# Patient Record
Sex: FEMALE | Race: WHITE | Employment: OTHER | URBAN - METROPOLITAN AREA
[De-identification: names, ages, dates, MRNs, and addresses within clinical notes are randomized per-mention and may not be internally consistent; named-entity substitution may affect disease eponyms.]

---

## 2017-06-08 ENCOUNTER — HOSPITAL ENCOUNTER (OUTPATIENT)
Dept: MAMMOGRAPHY | Age: 72
Discharge: HOME | End: 2017-06-08
Attending: FAMILY MEDICINE
Payer: MEDICARE

## 2017-06-08 DIAGNOSIS — Z12.31 VISIT FOR SCREENING MAMMOGRAM: ICD-10-CM

## 2017-06-08 PROCEDURE — 77067 SCR MAMMO BI INCL CAD: CPT

## 2017-06-20 NOTE — PROGRESS NOTES
No specific evidence of cancer but More views needed. Radiology should contact to set up.  If have not heard from them in 2 days let us know

## 2017-06-22 ENCOUNTER — HOSPITAL ENCOUNTER (OUTPATIENT)
Dept: MAMMOGRAPHY | Age: 72
Discharge: HOME | End: 2017-06-22
Attending: FAMILY MEDICINE
Payer: MEDICARE

## 2017-06-22 DIAGNOSIS — R92.8 ABNORMAL MAMMOGRAM: ICD-10-CM

## 2017-06-22 PROCEDURE — 77066 DX MAMMO INCL CAD BI: CPT

## 2017-06-22 PROCEDURE — 76642 ULTRASOUND BREAST LIMITED: CPT

## 2017-06-23 ENCOUNTER — HOSPITAL ENCOUNTER (OUTPATIENT)
Dept: MAMMOGRAPHY | Age: 72
Discharge: HOME | End: 2017-06-23
Attending: FAMILY MEDICINE
Payer: MEDICARE

## 2017-06-23 VITALS — SYSTOLIC BLOOD PRESSURE: 158 MMHG | HEART RATE: 79 BPM | TEMPERATURE: 96.1 F | DIASTOLIC BLOOD PRESSURE: 76 MMHG

## 2017-06-23 DIAGNOSIS — N63.10 MASS OF RIGHT BREAST: ICD-10-CM

## 2017-06-23 DIAGNOSIS — N63.10 BREAST MASS, RIGHT: ICD-10-CM

## 2017-06-23 PROCEDURE — 88305 TISSUE EXAM BY PATHOLOGIST: CPT | Performed by: FAMILY MEDICINE

## 2017-06-23 PROCEDURE — 74011000250 HC RX REV CODE- 250

## 2017-06-23 PROCEDURE — A4648 IMPLANTABLE TISSUE MARKER: HCPCS

## 2017-06-23 PROCEDURE — 77065 DX MAMMO INCL CAD UNI: CPT

## 2017-06-23 RX ORDER — LIDOCAINE HYDROCHLORIDE 10 MG/ML
5 INJECTION INFILTRATION; PERINEURAL
Status: COMPLETED | OUTPATIENT
Start: 2017-06-23 | End: 2017-06-23

## 2017-06-23 RX ADMIN — LIDOCAINE HYDROCHLORIDE 5 ML: 10 INJECTION, SOLUTION INFILTRATION; PERINEURAL at 09:05

## 2017-08-16 ENCOUNTER — HOSPITAL ENCOUNTER (OUTPATIENT)
Dept: PHYSICAL THERAPY | Age: 72
Discharge: HOME | End: 2017-08-16
Attending: FAMILY MEDICINE
Payer: MEDICARE

## 2017-08-16 DIAGNOSIS — M70.61 TROCHANTERIC BURSITIS OF RIGHT HIP: ICD-10-CM

## 2017-08-16 PROCEDURE — 97035 APP MDLTY 1+ULTRASOUND EA 15: CPT

## 2017-08-16 PROCEDURE — G8979 MOBILITY GOAL STATUS: HCPCS

## 2017-08-16 PROCEDURE — 97161 PT EVAL LOW COMPLEX 20 MIN: CPT

## 2017-08-16 PROCEDURE — G8978 MOBILITY CURRENT STATUS: HCPCS

## 2017-08-16 PROCEDURE — 97110 THERAPEUTIC EXERCISES: CPT

## 2017-08-16 NOTE — PROGRESS NOTES
Therapy Center at [facility] Therapy   [address], [address]  [phone]   [phone]    Cedrick Kessler  : 1945       OUTPATIENT PHYSICAL THERAPY:Initial Assessment 2017    ICD-10: Treatment Diagnosis: R hip bursitis M70.61  Precautions/Allergies:   Codeine phosphate; Flagyl [metronidazole]; Levaquin [levofloxacin]; and Zithromax [azithromycin]   Fall Risk Score: 2 (? 5 = High Risk)  MD Orders: Eval and treat MEDICAL/REFERRING DIAGNOSIS:  Trochanteric bursitis of right hip [M70.61]   DATE OF ONSET: 2017  REFERRING PHYSICIAN: Harpreet Mackay. [facility].: as needed     INITIAL ASSESSMENT:  Ms. Tiffanie Buck presents with increased right hip pain since April. She reported moving to a new home and she was lifting and moving boxes more than she normally would. She thinks this may have caused the pain. She has had an injection in the hip early August and reported that has helped some. The area is still painful with sitting, standing and first few initial steps after standing up. PROBLEM LIST (Impacting functional limitations):  1. Decreased Strength  2. Decreased Ambulation Ability/Technique  3. Increased Pain  4. Decreased Flexibility/Joint Mobility INTERVENTIONS PLANNED:  1. Heat  2. Home Exercise Program (HEP)  3. Manual Therapy  4. Range of Motion (ROM)  5. Therapeutic Exercise/Strengthening  6. Ultrasound (US)   TREATMENT PLAN:  Effective Dates: 17 TO 10/25/17. Frequency/Duration: 2 times a week for 10 weeks and upon reassessment,  will adjust frequency and duration as progress indicates. GOALS: (Goals have been discussed and agreed upon with patient.)  Short-Term Functional Goals: Time Frame: 5 weeks  1. Establish independent HEP with no cueing. 2. Pt will be able to report walking up to 20 minutes without increased pain.   3. Pt will be able to gain 1/2 grade increase in strength to stand and walk for longer periods of time. Discharge Goals: Time Frame: 10 weeks  1. Improve score on LEFS by at least 10 points for improved ADL function. 2. Pt will be able to report little to no difficulty with housework. 3. Pt will be able to report ability to lie on right side for at least 2-3 hours per night without medication. Rehabilitation Potential For Stated Goals: Good  Regarding Elvia Card's therapy, I certify that the treatment plan above will be carried out by a therapist or under their direction. Thank you for this referral,  Quincy Patterson, PT     Referring Physician Signature: Zeenat Cheng*              Date                    The information in this section was collected on 8/16/17 (except where otherwise noted). HISTORY:   History of Present Injury/Illness (Reason for Referral):  Pt is a 67year old female with increased right hip pain since April. She reported moving to a new home and moved boxes more than she normally would and thought this might have increased her pain. She did receive injection in early August which has helped some. She still reports pain with prolonged standing, walking and first few steps of walking after standing up. Past Medical History/Comorbidities:   Ms. Lucrecia Ace  has a past medical history of Atypical depressive disorder; Chronic renal disease; Essential hypertension, benign; Glaucoma; Insomnia, unspecified; Iron deficiency anemia secondary to inadequate dietary iron intake; Migraines; Mixed hyperlipidemia; Splenic artery aneurysm ([address] Unspecified hypothyroidism; and Varicella. Ms. Lucrecia Ace  has a past surgical history that includes hysterectomy; tonsil and adenoidectomy; cholecystectomy; urological; refractive surgery; colonoscopy (2012); and endoscopy (2012). Social History/Living Environment:     pt lives with spouse.   He has chronic low back pain  Prior Level of Function/Work/Activity:  retired  Dominant Side:         RIGHT    Current Medications: Current Outpatient Prescriptions:     diclofenac (VOLTAREN) 1 % gel, Apply  to affected area four (4) times daily. , Disp: 100 g, Rfl: 1    simvastatin (ZOCOR) 40 mg tablet, Take 1 Tab by mouth nightly., Disp: 90 Tab, Rfl: 3    levothyroxine (SYNTHROID) 50 mcg tablet, TAKE 1 TABLET DAILY (BEFORE BREAKFAST). , Disp: 90 Tab, Rfl: 3    amitriptyline (ELAVIL) 75 mg tablet, Take 1 Tab by mouth nightly., Disp: 90 Tab, Rfl: 4    butalbital-acetaminophen-caffeine (FIORICET, ESGIC) -40 mg per tablet, Take 1 Tab by mouth every four (4) hours as needed for Pain. Max Daily Amount: 6 Tabs., Disp: 60 Tab, Rfl: 1    omeprazole (PRILOSEC) 20 mg capsule, Take 1 Cap by mouth daily. , Disp: 90 Cap, Rfl: 4    lisinopril (PRINIVIL, ZESTRIL) 20 mg tablet, Take 1 Tab by mouth two (2) times a day., Disp: 180 Tab, Rfl: 4    aspirin delayed-release 81 mg tablet, Take  by mouth daily. , Disp: , Rfl:    Date Last Reviewed:  8/16/2017     Number of Personal Factors/Comorbidities that affect the Plan of Care: 1-2: MODERATE COMPLEXITY   EXAMINATION:   Palpation:          Increased pain along the posterior aspect of greater trochanter and along insertion of glut medius. Pt does have left iliac crest higher than the right with lateral trunk shifted to the left side    ROM:        R hip ER limited 30%. All other motions WFL and left hip ROM WFL                                    Strength:     R hip flex 4/5, quad 4/5, HS 4-/5, glut med 3-/5, ankle PF/DF 5/5         L hip flex 4+/5, quad 4/5, HS 4-/5, glut med 4+/5, ankle DF/PF 5/5            Special Tests: negative LLD  Neurological Screen: na  Balance:  na   Body Structures Involved:  1. Bones  2. Joints  3. Muscles  4. Ligaments Body Functions Affected:  1. Neuromusculoskeletal  2. Movement Related Activities and Participation Affected:  1. Mobility  2. Interpersonal Interactions and Relationships  3.  Community, Social and [place]   Number of elements (examined above) that affect the Plan of Care: 3: MODERATE COMPLEXITY   CLINICAL PRESENTATION:   Presentation: Stable and uncomplicated: LOW COMPLEXITY   CLINICAL DECISION MAKING:   Outcome Measure: Tool Used: Lower Extremity Functional Scale (LEFS)  Score:  Initial: 36/80 Most Recent: X/80 (Date: -- )   Interpretation of Score: 20 questions each scored on a 5 point scale with 0 representing \"extreme difficulty or unable to perform\" and 4 representing \"no difficulty\". The lower the score, the greater the functional disability. 80/80 represents no disability. Minimal detectable change is 9 points. Score 80 79-63 62-48 47-32 31-16 15-1 0   Modifier CH CI CJ CK CL CM CN     ? Mobility - Walking and Moving Around:     - CURRENT STATUS: CK - 40%-59% impaired, limited or restricted    - GOAL STATUS: CJ - 20%-39% impaired, limited or restricted    - D/C STATUS:  ---------------To be determined---------------      Medical Necessity:   · Patient is expected to demonstrate progress in strength to improve her standing and walking times and ability to sleep on her right side. Reason for Services/Other Comments:  · Patient continues to require skilled intervention due to ongoing muscle weakness and pain in the right hip that is affecting her ability to walk or stand or even sleep on her right side. Use of outcome tool(s) and clinical judgement create a POC that gives a: Clear prediction of patient's progress: LOW COMPLEXITY            TREATMENT:   (In addition to Assessment/Re-Assessment sessions the following treatments were rendered)  Pre-treatment Symptoms/Complaints:  Pt reporting pain and tenderness in the right hip. Pain: Initial: Pain Intensity 1: 5  Post Session:  4/10     THERAPEUTIC EXERCISE: (10 minutes):  Exercises per grid below to improve mobility and strength. Required minimal verbal cues to promote proper body mechanics.   Progressed resistance, range, repetitions and complexity of movement as indicated. Sidelying hip abd x 10  Sidelying clamshell x 10  Standing hip ext x 10  Evaluation (  xx   ):    Manual Therapy (  5 min    ): light STM to the right hip/greater trochanter region    Therapeutic Modalities:US x 8 minutes to right hip at 1MHz and 1.4 iglesias per cm squared for improved pain and inflammation control    HEP: As above; handouts given to patient for all exercises. Treatment/Session Assessment:    · Response to Treatment:  Pt responded well to treatment today. She does have increased right glut medius strength as well as quad and HS weakness. Her lack of full glut medius strength has contributed greatly to the amount of pain she is feeling in the right hip. As her strength improves in the right LE, there should be a reduction in her overall right hip pain. She will also benefit from US and manual therapy in order to help limit the amount of pain and inflammation. · Compliance with Program/Exercises: Will assess as treatment progresses. · Recommendations/Intent for next treatment session: \"Next visit will focus on advancements to more challenging activities\".   Total Treatment Duration:50 min  PT Patient Time In/Time Out  Time In: 1010  Time Out: 1100  Treatment number 1901 Ivis Fairchild PT

## 2017-08-16 NOTE — PROGRESS NOTES
.                     Ambulatory/Rehab Services H2 Model Falls Risk Assessment    Risk Factor Pts. ·   Confusion/Disorientation/Impulsivity  []    4 ·   Symptomatic Depression  [x]   2 ·   Altered Elimination  []   1 ·   Dizziness/Vertigo  []   1 ·   Gender (Male)  []   1 ·   Any administered antiepileptics (anticonvulsants):  []   2 ·   Any administered benzodiazepines:  []   1 ·   Visual Impairment (specify):  []   1 ·   Portable Oxygen Use  []   1 ·   Orthostatic ? BP  []   1 ·   History of Recent Falls (within 3 mos.)  []   5     Ability to Rise from Chair (choose one) Pts. ·   Ability to rise in a single movement  []   0 ·   Pushes up, successful in one attempt  []   1 ·   Multiple attempts, but successful  []   3 ·   Unable to rise without assistance  []   4   Total: (5 or greater = High Risk) 2     Falls Prevention Plan:   []                Physical Limitations to Exercise (specify):   []                Mobility Assistance Device (type):   []                Exercise/Equipment Adaptation (specify):    ©2010 [facility] [address] Patent #[id number].  Federal Law prohibits the replication, distribution or use without written permission from [facility] [place]

## 2017-08-18 ENCOUNTER — HOSPITAL ENCOUNTER (OUTPATIENT)
Dept: PHYSICAL THERAPY | Age: 72
Discharge: HOME | End: 2017-08-18
Attending: FAMILY MEDICINE
Payer: MEDICARE

## 2017-08-18 PROCEDURE — 97140 MANUAL THERAPY 1/> REGIONS: CPT

## 2017-08-18 PROCEDURE — 97035 APP MDLTY 1+ULTRASOUND EA 15: CPT

## 2017-08-18 PROCEDURE — 97110 THERAPEUTIC EXERCISES: CPT

## 2017-08-18 NOTE — PROGRESS NOTES
Therapy Center at [facility] Therapy   [address], [address]  [phone]   [phone]    Mary Mo  : 1945       OUTPATIENT PHYSICAL THERAPY:Daily Note 2017    ICD-10: Treatment Diagnosis: R hip bursitis M70.61  Precautions/Allergies:   Codeine phosphate; Flagyl [metronidazole]; Levaquin [levofloxacin]; and Zithromax [azithromycin]   Fall Risk Score: 2 (? 5 = High Risk)  MD Orders: Eval and treat MEDICAL/REFERRING DIAGNOSIS:  Trochanteric bursitis, right hip [M70.61]   DATE OF ONSET: 2017  REFERRING PHYSICIAN: Harpreet Mackay. [facility].: as needed     INITIAL ASSESSMENT:  Ms. Don De Leon presents with increased right hip pain since April. She reported moving to a new home and she was lifting and moving boxes more than she normally would. She thinks this may have caused the pain. She has had an injection in the hip early August and reported that has helped some. The area is still painful with sitting, standing and first few initial steps after standing up. PROBLEM LIST (Impacting functional limitations):  1. Decreased Strength  2. Decreased Ambulation Ability/Technique  3. Increased Pain  4. Decreased Flexibility/Joint Mobility INTERVENTIONS PLANNED:  1. Heat  2. Home Exercise Program (HEP)  3. Manual Therapy  4. Range of Motion (ROM)  5. Therapeutic Exercise/Strengthening  6. Ultrasound (US)   TREATMENT PLAN:  Effective Dates: 17 TO 10/25/17. Frequency/Duration: 2 times a week for 10 weeks and upon reassessment,  will adjust frequency and duration as progress indicates. GOALS: (Goals have been discussed and agreed upon with patient.)  Short-Term Functional Goals: Time Frame: 5 weeks  1. Establish independent HEP with no cueing. 2. Pt will be able to report walking up to 20 minutes without increased pain.   3. Pt will be able to gain 1/2 grade increase in strength to stand and walk for longer periods of time. Discharge Goals: Time Frame: 10 weeks  1. Improve score on LEFS by at least 10 points for improved ADL function. 2. Pt will be able to report little to no difficulty with housework. 3. Pt will be able to report ability to lie on right side for at least 2-3 hours per night without medication. Rehabilitation Potential For Stated Goals: Good  Regarding Linda Eloina Stephie's therapy, I certify that the treatment plan above will be carried out by a therapist or under their direction. Thank you for this referral,  Becky Diaz PT     Referring Physician Signature: Janet Joyce*              Date                    The information in this section was collected on 8/16/17 (except where otherwise noted). HISTORY:   History of Present Injury/Illness (Reason for Referral):  Pt is a 67year old female with increased right hip pain since April. She reported moving to a new home and moved boxes more than she normally would and thought this might have increased her pain. She did receive injection in early August which has helped some. She still reports pain with prolonged standing, walking and first few steps of walking after standing up. Past Medical History/Comorbidities:   Ms. Tiffanie Buck  has a past medical history of Atypical depressive disorder; Chronic renal disease; Essential hypertension, benign; Glaucoma; Insomnia, unspecified; Iron deficiency anemia secondary to inadequate dietary iron intake; Migraines; Mixed hyperlipidemia; Splenic artery aneurysm ([address] Unspecified hypothyroidism; and Varicella. Ms. Tiffanie Buck  has a past surgical history that includes hysterectomy; tonsil and adenoidectomy; cholecystectomy; urological; refractive surgery; colonoscopy (2012); and endoscopy (2012). Social History/Living Environment:     pt lives with spouse.   He has chronic low back pain  Prior Level of Function/Work/Activity:  retired  Dominant Side:         RIGHT    Current Medications: Current Outpatient Prescriptions:     diclofenac (VOLTAREN) 1 % gel, Apply  to affected area four (4) times daily. , Disp: 100 g, Rfl: 1    simvastatin (ZOCOR) 40 mg tablet, Take 1 Tab by mouth nightly., Disp: 90 Tab, Rfl: 3    levothyroxine (SYNTHROID) 50 mcg tablet, TAKE 1 TABLET DAILY (BEFORE BREAKFAST). , Disp: 90 Tab, Rfl: 3    amitriptyline (ELAVIL) 75 mg tablet, Take 1 Tab by mouth nightly., Disp: 90 Tab, Rfl: 4    butalbital-acetaminophen-caffeine (FIORICET, ESGIC) -40 mg per tablet, Take 1 Tab by mouth every four (4) hours as needed for Pain. Max Daily Amount: 6 Tabs., Disp: 60 Tab, Rfl: 1    omeprazole (PRILOSEC) 20 mg capsule, Take 1 Cap by mouth daily. , Disp: 90 Cap, Rfl: 4    lisinopril (PRINIVIL, ZESTRIL) 20 mg tablet, Take 1 Tab by mouth two (2) times a day., Disp: 180 Tab, Rfl: 4    aspirin delayed-release 81 mg tablet, Take  by mouth daily. , Disp: , Rfl:    Date Last Reviewed:  8/18/2017     Number of Personal Factors/Comorbidities that affect the Plan of Care: 1-2: MODERATE COMPLEXITY   EXAMINATION:   Palpation:          Increased pain along the posterior aspect of greater trochanter and along insertion of glut medius. Pt does have left iliac crest higher than the right with lateral trunk shifted to the left side    ROM:        R hip ER limited 30%. All other motions WFL and left hip ROM WFL                                    Strength:     R hip flex 4/5, quad 4/5, HS 4-/5, glut med 3-/5, ankle PF/DF 5/5         L hip flex 4+/5, quad 4/5, HS 4-/5, glut med 4+/5, ankle DF/PF 5/5            Special Tests: negative LLD  Neurological Screen: na  Balance:  na   Body Structures Involved:  1. Bones  2. Joints  3. Muscles  4. Ligaments Body Functions Affected:  1. Neuromusculoskeletal  2. Movement Related Activities and Participation Affected:  1. Mobility  2. Interpersonal Interactions and Relationships  3.  Community, Social and [place]   Number of elements (examined above) that affect the Plan of Care: 3: MODERATE COMPLEXITY   CLINICAL PRESENTATION:   Presentation: Stable and uncomplicated: LOW COMPLEXITY   CLINICAL DECISION MAKING:   Outcome Measure: Tool Used: Lower Extremity Functional Scale (LEFS)  Score:  Initial: 36/80 Most Recent: X/80 (Date: -- )   Interpretation of Score: 20 questions each scored on a 5 point scale with 0 representing \"extreme difficulty or unable to perform\" and 4 representing \"no difficulty\". The lower the score, the greater the functional disability. 80/80 represents no disability. Minimal detectable change is 9 points. Score 80 79-63 62-48 47-32 31-16 15-1 0   Modifier CH CI CJ CK CL CM CN     ? Mobility - Walking and Moving Around:     - CURRENT STATUS: CK - 40%-59% impaired, limited or restricted    - GOAL STATUS: CJ - 20%-39% impaired, limited or restricted    - D/C STATUS:  ---------------To be determined---------------      Medical Necessity:   · Patient is expected to demonstrate progress in strength to improve her standing and walking times and ability to sleep on her right side. Reason for Services/Other Comments:  · Patient continues to require skilled intervention due to ongoing muscle weakness and pain in the right hip that is affecting her ability to walk or stand or even sleep on her right side. Use of outcome tool(s) and clinical judgement create a POC that gives a: Clear prediction of patient's progress: LOW COMPLEXITY            TREATMENT:   (In addition to Assessment/Re-Assessment sessions the following treatments were rendered)  Pre-treatment Symptoms/Complaints:  Pt reporting no increased pain today. Pain: Initial: Pain Intensity 1: 4  Post Session:  4/10     THERAPEUTIC EXERCISE: (30 minutes):  Exercises per grid below to improve mobility and strength. Required minimal verbal cues to promote proper body mechanics. Progressed resistance, range, repetitions and complexity of movement as indicated.   Sidelying hip abd 2  x 10  Hip abd 12.5# x 20 bilaterally   HS curls 20# x 20  Lateral 6 inch step up x 20  Bridging x 20      Manual Therapy (  10 min    ): light STM to the right hip/greater trochanter region    Therapeutic Modalities:US x 10 minutes to right hip at 1MHz and 1.4 iglesias per cm squared for improved pain and inflammation control    HEP: As above; handouts given to patient for all exercises. Treatment/Session Assessment:    · Response to Treatment:  Pt reporting no increased pain with treatment. The area around the greater trochanter remains tender with palpation. She is still very weak and will benefit from strengthening of the gluts and hips to help decrease her overall pain levels. · Compliance with Program/Exercises: Will assess as treatment progresses. · Recommendations/Intent for next treatment session: \"Next visit will focus on advancements to more challenging activities\".   Total Treatment Duration:50 min  PT Patient Time In/Time Out  Time In: 0900  Time Out: 1167  Treatment number 2  Lesa Tyler, PT

## 2017-08-22 ENCOUNTER — APPOINTMENT (OUTPATIENT)
Dept: PHYSICAL THERAPY | Age: 72
End: 2017-08-22
Attending: FAMILY MEDICINE
Payer: MEDICARE

## 2017-10-03 NOTE — PROGRESS NOTES
[facility] at [facility]   [address], [address]  [phone]   [phone]    Kassy Mckeon  : 1945       OUTPATIENT PHYSICAL THERAPY:Discontinuation Summary 10/3/2017    ICD-10: Treatment Diagnosis: R hip bursitis M70.61  Precautions/Allergies:   Codeine phosphate; Flagyl [metronidazole]; Levaquin [levofloxacin]; and Zithromax [azithromycin]   Fall Risk Score: 2 (? 5 = High Risk)  MD Orders: Eval and treat MEDICAL/REFERRING DIAGNOSIS:  Trochanteric bursitis, right hip [M70.61]   DATE OF ONSET: 2017  REFERRING PHYSICIAN: Harpreet Mackay. [facility].: as needed     INITIAL ASSESSMENT:  Ms. Amarilis Capps presents with increased right hip pain since April. She reported moving to a new home and she was lifting and moving boxes more than she normally would. She thinks this may have caused the pain. She has had an injection in the hip early August and reported that has helped some. The area is still painful with sitting, standing and first few initial steps after standing up. PROBLEM LIST (Impacting functional limitations):  1. Decreased Strength  2. Decreased Ambulation Ability/Technique  3. Increased Pain  4. Decreased Flexibility/Joint Mobility INTERVENTIONS PLANNED:  1. Heat  2. Home Exercise Program (HEP)  3. Manual Therapy  4. Range of Motion (ROM)  5. Therapeutic Exercise/Strengthening  6. Ultrasound (US)   TREATMENT PLAN:  Effective Dates: 17 TO 10/25/17. Frequency/Duration: 2 times a week for 10 weeks and upon reassessment,  will adjust frequency and duration as progress indicates. GOALS: (Goals have been discussed and agreed upon with patient.)  Short-Term Functional Goals: Time Frame: 5 weeks  1. Establish independent HEP with no cueing.- met  2. Pt will be able to report walking up to 20 minutes without increased pain. -not met  3.  Pt will be able to gain 1/2 grade increase in strength to stand and walk for longer periods of time.-not met  Discharge Goals: Time Frame: 10 weeks  1. Improve score on LEFS by at least 10 points for improved ADL function.-not met  2. Pt will be able to report little to no difficulty with housework.-not met  3. Pt will be able to report ability to lie on right side for at least 2-3 hours per night without medication.-not met  Rehabilitation Potential For Stated Goals: Good  Regarding Jared Card's therapy, I certify that the treatment plan above will be carried out by a therapist or under their direction. Thank you for this referral,  Bo Mulligan, PT     Referring Physician Signature: [place]*              Date                    The information in this section was collected on 8/16/17 (except where otherwise noted). HISTORY:   History of Present Injury/Illness (Reason for Referral):  Pt is a 67year old female with increased right hip pain since April. She reported moving to a new home and moved boxes more than she normally would and thought this might have increased her pain. She did receive injection in early August which has helped some. She still reports pain with prolonged standing, walking and first few steps of walking after standing up. Past Medical History/Comorbidities:   Ms. Yolie Mancilla  has a past medical history of Atypical depressive disorder; Chronic renal disease; Essential hypertension, benign; Glaucoma; Insomnia, unspecified; Iron deficiency anemia secondary to inadequate dietary iron intake; Migraines; Mixed hyperlipidemia; Splenic artery aneurysm ([facility] [address] Unspecified hypothyroidism; and Varicella. Ms. Yolie Mancilla  has a past surgical history that includes hysterectomy; tonsil and adenoidectomy; cholecystectomy; urological; refractive surgery; colonoscopy (2012); and endoscopy (2012). Social History/Living Environment:     pt lives with spouse.   He has chronic low back pain  Prior Level of Function/Work/Activity:  retired  Dominant Side:         RIGHT    Current Medications:       Current Outpatient Prescriptions:     lisinopril (PRINIVIL, ZESTRIL) 20 mg tablet, Take 1 Tab by mouth two (2) times a day., Disp: 180 Tab, Rfl: 4    butalbital-acetaminophen-caffeine (FIORICET, ESGIC) -40 mg per tablet, Take 1 Tab by mouth every four (4) hours as needed for Pain. Max Daily Amount: 6 Tabs., Disp: 60 Tab, Rfl: 1    diclofenac (VOLTAREN) 1 % gel, Apply  to affected area four (4) times daily. , Disp: 100 g, Rfl: 1    simvastatin (ZOCOR) 40 mg tablet, Take 1 Tab by mouth nightly., Disp: 90 Tab, Rfl: 3    levothyroxine (SYNTHROID) 50 mcg tablet, TAKE 1 TABLET DAILY (BEFORE BREAKFAST). , Disp: 90 Tab, Rfl: 3    amitriptyline (ELAVIL) 75 mg tablet, Take 1 Tab by mouth nightly., Disp: 90 Tab, Rfl: 4    omeprazole (PRILOSEC) 20 mg capsule, Take 1 Cap by mouth daily. , Disp: 90 Cap, Rfl: 4    aspirin delayed-release 81 mg tablet, Take  by mouth daily. , Disp: , Rfl:    Date Last Reviewed:  8/18/2017     Number of Personal Factors/Comorbidities that affect the Plan of Care: 1-2: MODERATE COMPLEXITY   EXAMINATION:   Palpation:          Increased pain along the posterior aspect of greater trochanter and along insertion of glut medius. Pt does have left iliac crest higher than the right with lateral trunk shifted to the left side    ROM:        R hip ER limited 30%. All other motions WFL and left hip ROM WFL                                    Strength:     R hip flex 4/5, quad 4/5, HS 4-/5, glut med 3-/5, ankle PF/DF 5/5         L hip flex 4+/5, quad 4/5, HS 4-/5, glut med 4+/5, ankle DF/PF 5/5            Special Tests: negative LLD  Neurological Screen: na  Balance:  na   Body Structures Involved:  1. Bones  2. Joints  3. Muscles  4. Ligaments Body Functions Affected:  1. Neuromusculoskeletal  2. Movement Related Activities and Participation Affected:  1. Mobility  2.  Interpersonal Interactions and Relationships  3. Community, Social and [place]   Number of elements (examined above) that affect the Plan of Care: 3: MODERATE COMPLEXITY   CLINICAL PRESENTATION:   Presentation: Stable and uncomplicated: LOW COMPLEXITY   CLINICAL DECISION MAKING:   Outcome Measure: Tool Used: Lower Extremity Functional Scale (LEFS)  Score:  Initial: 36/80 Most Recent: X/80 (Date: -- )   Interpretation of Score: 20 questions each scored on a 5 point scale with 0 representing \"extreme difficulty or unable to perform\" and 4 representing \"no difficulty\". The lower the score, the greater the functional disability. 80/80 represents no disability. Minimal detectable change is 9 points. Score 80 79-63 62-48 47-32 31-16 15-1 0   Modifier CH CI CJ CK CL CM CN     ? Mobility - Walking and Moving Around:     - CURRENT STATUS: CK - 40%-59% impaired, limited or restricted    - GOAL STATUS: CJ - 20%-39% impaired, limited or restricted    - D/C STATUS:  ---------------To be determined---------------      Medical Necessity:   · Patient is expected to demonstrate progress in strength to improve her standing and walking times and ability to sleep on her right side. Reason for Services/Other Comments:  · Patient continues to require skilled intervention due to ongoing muscle weakness and pain in the right hip that is affecting her ability to walk or stand or even sleep on her right side. Use of outcome tool(s) and clinical judgement create a POC that gives a: Clear prediction of patient's progress: LOW COMPLEXITY                Discontinuation Assessment:      Pt was seen for two visits for US and strengthening. She had no complaints with therapy and was asked to reschedule, but failed to do so. [facility] office staff contacted pt and stated she no longer needed therapy. Pt's therapy services have been discontinued.   Ryan Ma, PT

## 2018-01-08 ENCOUNTER — HOSPITAL ENCOUNTER (OUTPATIENT)
Dept: MAMMOGRAPHY | Age: 73
Discharge: HOME | End: 2018-01-08
Attending: FAMILY MEDICINE
Payer: MEDICARE

## 2018-01-08 DIAGNOSIS — Q83.9 BREAST ANOMALY: ICD-10-CM

## 2018-01-08 PROCEDURE — 76642 ULTRASOUND BREAST LIMITED: CPT

## 2018-08-03 ENCOUNTER — HOSPITAL ENCOUNTER (OUTPATIENT)
Age: 73
Setting detail: OUTPATIENT SURGERY
Discharge: HOME | End: 2018-08-03
Attending: SURGERY | Admitting: SURGERY
Payer: MEDICARE

## 2018-08-03 ENCOUNTER — ANESTHESIA EVENT (OUTPATIENT)
Dept: ENDOSCOPY | Age: 73
End: 2018-08-03
Payer: MEDICARE

## 2018-08-03 ENCOUNTER — ANESTHESIA (OUTPATIENT)
Dept: ENDOSCOPY | Age: 73
End: 2018-08-03
Payer: MEDICARE

## 2018-08-03 VITALS
SYSTOLIC BLOOD PRESSURE: 152 MMHG | BODY MASS INDEX: 21.01 KG/M2 | OXYGEN SATURATION: 97 % | WEIGHT: 107 LBS | HEIGHT: 60 IN | RESPIRATION RATE: 14 BRPM | TEMPERATURE: 98.6 F | DIASTOLIC BLOOD PRESSURE: 77 MMHG | HEART RATE: 70 BPM

## 2018-08-03 PROCEDURE — 76060000032 HC ANESTHESIA 0.5 TO 1 HR: Performed by: SURGERY

## 2018-08-03 PROCEDURE — 76040000025: Performed by: SURGERY

## 2018-08-03 PROCEDURE — 74011250636 HC RX REV CODE- 250/636: Performed by: ANESTHESIOLOGY

## 2018-08-03 PROCEDURE — 74011250636 HC RX REV CODE- 250/636

## 2018-08-03 RX ORDER — LIDOCAINE HYDROCHLORIDE 20 MG/ML
INJECTION, SOLUTION EPIDURAL; INFILTRATION; INTRACAUDAL; PERINEURAL AS NEEDED
Status: DISCONTINUED | OUTPATIENT
Start: 2018-08-03 | End: 2018-08-03 | Stop reason: HOSPADM

## 2018-08-03 RX ORDER — SODIUM CHLORIDE 0.9 % (FLUSH) 0.9 %
5-10 SYRINGE (ML) INJECTION AS NEEDED
Status: DISCONTINUED | OUTPATIENT
Start: 2018-08-03 | End: 2018-08-03 | Stop reason: HOSPADM

## 2018-08-03 RX ORDER — PROPOFOL 10 MG/ML
INJECTION, EMULSION INTRAVENOUS
Status: DISCONTINUED | OUTPATIENT
Start: 2018-08-03 | End: 2018-08-03 | Stop reason: HOSPADM

## 2018-08-03 RX ORDER — PROPOFOL 10 MG/ML
INJECTION, EMULSION INTRAVENOUS AS NEEDED
Status: DISCONTINUED | OUTPATIENT
Start: 2018-08-03 | End: 2018-08-03 | Stop reason: HOSPADM

## 2018-08-03 RX ORDER — SODIUM CHLORIDE, SODIUM LACTATE, POTASSIUM CHLORIDE, CALCIUM CHLORIDE 600; 310; 30; 20 MG/100ML; MG/100ML; MG/100ML; MG/100ML
100 INJECTION, SOLUTION INTRAVENOUS CONTINUOUS
Status: DISCONTINUED | OUTPATIENT
Start: 2018-08-03 | End: 2018-08-03 | Stop reason: HOSPADM

## 2018-08-03 RX ADMIN — LIDOCAINE HYDROCHLORIDE 40 MG: 20 INJECTION, SOLUTION EPIDURAL; INFILTRATION; INTRACAUDAL; PERINEURAL at 09:33

## 2018-08-03 RX ADMIN — PROPOFOL 140 MCG/KG/MIN: 10 INJECTION, EMULSION INTRAVENOUS at 09:34

## 2018-08-03 RX ADMIN — SODIUM CHLORIDE, SODIUM LACTATE, POTASSIUM CHLORIDE, AND CALCIUM CHLORIDE 100 ML/HR: 600; 310; 30; 20 INJECTION, SOLUTION INTRAVENOUS at 09:11

## 2018-08-03 RX ADMIN — PROPOFOL 40 MG: 10 INJECTION, EMULSION INTRAVENOUS at 09:34

## 2018-08-03 NOTE — H&P
Progress Notes Encounter Date: 8/3/18 Jude Juarez MD  
General Surgery []Hide copied text []Hover for attribution information 
  
  
  
Name: Christelle Ace MRN: [id number] : 1945 Age: 75 y.o. Sex: female 
Ivy Garcia MD  
  
  
CC:   
    
Chief Complaint Patient presents with  New Patient  
    colonoscopy  
  
  
HPI:  The patient is referred here for a colonoscopy by Dr. Tracy Ames. The patient had one of three guaiac cards positive for blood, so Dr. Tracy Ames referred her for a colonoscopy. No recent abdominal pain, nausea or vomiting. Her last colonoscopy was in  which was negative for masses or polyps.   
  
  
Family hx of colon cancer NO  
   
Previous colonoscopy YES  
BRBPR NO  
Melena NO Loss of appetite NO Weight loss NO  
   
Change in bowel habits NO  
  
  
HISTORY: 
  
    
Past Medical History:  
Diagnosis Date  Atypical depressive disorder    
 Chronic renal disease    
 Essential hypertension, benign    
 Glaucoma    
  suspect s/p SLT ou (to open up angles and relieve eye pressure)  Insomnia, unspecified    
 Iron deficiency anemia secondary to inadequate dietary iron intake    
 Migraines    
 Mixed hyperlipidemia    
 Splenic artery aneurysm (HCC)    
  followed by Dr. Era Alberto  Unspecified hypothyroidism    
 Varicella    
  per labwork  
  
     
Past Surgical History:  
Procedure Laterality Date  HX CHOLECYSTECTOMY      
 [facility] COLONOSCOPY   2012  
  [facility]  HX ENDOSCOPY   2012  
  [facility]  HX HYSTERECTOMY      
 HX REFRACTIVE SURGERY      
 HX TONSIL AND ADENOIDECTOMY      
 HX UROLOGICAL      
  bladder surgery - tack  
  
       
Prior to Admission medications Medication Sig Start Date End Date Taking?  Authorizing Provider  
calcium polycarbophil (FIBER LAXATIVE, CA POLYCARBO,) 625 mg tablet Take 625 mg by mouth daily.     Yes Historical Provider  
butalbital-acetaminophen-caffeine (FIORICET, ESGIC) -40 mg per tablet Take 1 Tab by mouth every four (4) hours as needed for Pain. 5/23/18   Yes Leoncio Rodrigues MD  
amitriptyline (ELAVIL) 75 mg tablet Take 1 Tab by mouth nightly. 2/20/18   Yes Leoncio Rodrigues MD  
simvastatin (ZOCOR) 40 mg tablet Take 1 Tab by mouth nightly. 2/20/18   Yes Leoncio Rodrigues MD  
levothyroxine (SYNTHROID) 50 mcg tablet TAKE 1 TABLET DAILY (BEFORE BREAKFAST). 2/20/18   Yes Leoncio Rodrigues MD  
omeprazole (PRILOSEC) 20 mg capsule Take 1 Cap by mouth daily. 11/27/17   Yes Leoncio Rodrigues MD  
lisinopril (PRINIVIL, ZESTRIL) 20 mg tablet Take 1 Tab by mouth two (2) times a day. 9/26/17   Yes Leoncio Rodrigues MD  
diclofenac (VOLTAREN) 1 % gel Apply  to affected area four (4) times daily. 8/14/17   Yes Leoncio Rodrigues MD  
aspirin delayed-release 81 mg tablet Take  by mouth daily.     Yes Historical Provider  
  
    
Social History Substance Use Topics  Smoking status: Never Smoker  Smokeless tobacco: Never Used  Alcohol use No  
  
      
Family History Problem Relation Age of Onset  Heart Failure Mother    
    CHF  Stroke Father    
 Breast Cancer Maternal Aunt 54  
  
. Allergies Allergen Reactions  Codeine Phosphate Nausea and Vomiting  Flagyl [Metronidazole] Nausea and Vomiting  Levaquin [Levofloxacin] Other (comments)  
    Tendonitis (severe both shoulders)  Zithromax [Azithromycin] Nausea and Vomiting  
  
  
Physical Exam:  
  
    
Visit Vitals  /79  Ht 4' 11.4\" (1.509 m)  Wt 107 lb (48.5 kg)  LMP Comment: 80  
 BMI 21.32 kg/m2  
  
  
General: Alert, oriented, cooperative white female in no acute distress.  
  
  
Resp: Breathing is  non-labored. Lungs clear to auscultation without wheezing or rhonchi CV: RRR. No murmurs, rubs or gallops appreciated. Abd: soft non-tender and non-distended without peritoneal signs. +bs Extremities: No clubbing, cyanosis or edema. Strength intact. 
  
  
Assessment/Plan:  Megan  is a 75 y.o. female who is here for a colonoscopy due to guaiac positive cards. 
  
1. Off ASA for one week, if on aspirin 
  
2. Bowel prep 
  
3. Colonoscopy with MAC. I went through the risks of bleeding, perforation of the colon and cardiopulmonary problems that can develop with the use of anesthesia. 
  
Jairon Sabillon MD 
  
 FACS   8/3/18   
  
  
  
   
  
Electronically signed by Jairon Sabillon MD at 8/3/18  
    
8/3/18 
     
    
Revision History  
     
    
Detailed Report  
     
   
Note shared with patient Note Details Author Jairon Sabillon MD File Time 8/3/18 Author Type Physician Status Signed Last  Jairon Sabillon MD Specialty General Surgery

## 2018-08-03 NOTE — INTERVAL H&P NOTE
H&P Update: 
Bobette Cooks was seen and examined. History and physical has been reviewed. The patient has been examined.  There have been no significant clinical changes since the completion of the originally dated History and Physical. 
 
Signed By: Mario Yeh MD   
 August 3, 2018 7:07 AM

## 2018-08-03 NOTE — ANESTHESIA PREPROCEDURE EVALUATION
Anesthetic History Review of Systems / Medical History Patient summary reviewed, nursing notes reviewed and pertinent labs reviewed Pulmonary Neuro/Psych Headaches (migraines) Cardiovascular Hypertension Hyperlipidemia Exercise tolerance: >4 METS Comments: Splenic artery aneurysm -- followed by vascular service at [facility]  
GI/Hepatic/Renal 
  
 
 
Renal disease: CRI Endo/Other Hypothyroidism: well controlled Other Findings Physical Exam 
 
Airway Mallampati: I 
TM Distance: 4 - 6 cm Neck ROM: normal range of motion Mouth opening: Normal 
 
 Cardiovascular Regular rate and rhythm,  S1 and S2 normal,  no murmur, click, rub, or gallop Dental 
No notable dental hx Pulmonary Breath sounds clear to auscultation Abdominal 
GI exam deferred Other Findings Anesthetic Plan ASA: 2 Anesthesia type: total IV anesthesia Anesthetic plan and risks discussed with: Patient

## 2018-08-03 NOTE — ANESTHESIA POSTPROCEDURE EVALUATION
Post-Anesthesia Evaluation and Assessment Patient: Umair Cerrato MRN: [id number]  SSN: [id number] YOB: 1945  Age: 75 y.o. Sex: female Cardiovascular Function/Vital Signs Visit Vitals  /78  Pulse 70  Temp 37 °C (98.6 °F)  Resp 16  
 Ht 5' (1.524 m)  Wt 48.5 kg (107 lb)  SpO2 97%  Breastfeeding No  
 BMI 20.9 kg/m2 Patient is status post total IV anesthesia anesthesia for Procedure(s): 
COLONOSCOPY / BMI=22. Nausea/Vomiting: None Postoperative hydration reviewed and adequate. Pain: 
Pain Scale 1: Visual (08/03/18 1010) Pain Intensity 1: 0 (08/03/18 1010) Managed Neurological Status: At baseline Mental Status and Level of Consciousness: Arousable Pulmonary Status:  
O2 Device: Nasal cannula (08/03/18 1010) Adequate oxygenation and airway patent Complications related to anesthesia: None Post-anesthesia assessment completed. No concerns Signed By: Dhaval Aviles MD   
 August 3, 2018

## 2018-08-03 NOTE — OP NOTES
[facility] REPORT    Brenda Anderson  MR#: [id number]  : 1945  ACCOUNT #: [de-identified]   DATE OF SERVICE: 2018    PREOPERATIVE DIAGNOSIS:  Hemoccult positive stool. POSTOPERATIVE DIAGNOSES:  1. Poor bowel prep. 2.  Sigmoid and descending colon diverticular disease. 3.  Grade I internal hemorrhoids. 4.  No masses, polyps, or bleeding noted. PROCEDURE PERFORMED:  Colonoscopy. SURGEON:  Samantha Song MD    ANESTHESIA:  Monitored anesthesia care with Dr. Wilfredo Payne and [facility] CRNA.    ESTIMATED BLOOD LOSS:  None. SPECIMENS REMOVED:  None. ASSISTANTS:  None. COMPLICATIONS:  None. IMPLANTS:  None. HISTORY OF PRESENT ILLNESS:  This is a 69-year-old female who was referred by Kyleigh Romero at [address] [facility] for a colonoscopy. She has had a recent Hemoccult cards 1/3 which were positive. She was referred to my office for a colonoscopy. I went through the procedure, risks of bleeding, infection, anesthesia, perforation of colon. She underwent a bowel prep on 2018. She felt like she was relatively clear at the end of the bowel prep; however, we found her to have fecal material seen throughout her colon and it was overall a poor prep. PROCEDURE IN DETAIL:  Nevertheless, she was taken to room #4 at [facility] endoscopy center, where she was placed in a stretcher in the left lateral decubitus position. Oxygen via nasal cannula was administered. We monitored the O2 sats and vital signs throughout the procedure. Monitored anesthesia care was done by Dr. Foreign Matt and [facility], the CRNA. Once the sedative effect had taken hold, the pediatric colonoscope was advanced through the anus and all the way to the cecum. Overall, the bowel prep was poor. She had sigmoid and descending colon diverticular disease. She had some grade I internal hemorrhoids seen on retroflexion of the scope.   No masses, polyps, or evidence of bleeding were noted. We advanced the scope to the cecum, which was identified with the ileocecal valve, cecal folds, external compression, right lower quadrant corresponded to an indentation seen with the scope. There were no lesions noted in the cecum, ascending colon, transverse colon. In the descending and sigmoid colon, there were some scattered sigmoid diverticula. No evidence of acute diverticulitis or bleeding was noted. Scope was brought back to the rectum, where it was retroflexed. She has some grade I internal hemorrhoids. Digital rectal exam revealed good sphincter tone, no masses palpable. FINDINGS:  1. Poor bowel prep. 2.  Sigmoid and descending colon diverticular disease. 3.  Grade I internal hemorrhoids. 4.  No masses, polyps, or bleeding noted. PLAN:  She is 75years old. I do not see a need to repeat her colonoscopy unless symptoms develop. If she should require another colonoscopy she would need a 2-day bowel prep.       Ladarius Weathers MD       [facility] / [place]  D: 08/03/2018 10:02     T: 08/03/2018 16:56  JOB #: [id number]

## 2018-08-03 NOTE — DISCHARGE INSTRUCTIONS
Gastrointestinal Colonoscopy/Flexible Sigmoidoscopy - Lower Exam Discharge Instructions  1. [phone] for any problems or questions. 2. Contact the doctors office for follow up appointment as directed  3. Medication may cause drowsiness for several hours, therefore, do not drive or operate machinery for remainder of the day. 4. No alcohol today. 5. Ordinarily, you may resume regular diet and activity after exam unless otherwise specified by your physician. 6. Because of air put into your colon during exam, you may experience some abdominal distension, relieved by the passage of gas, for several hours. 7. Contact your physician if you have any of the following:  a. Excessive amount of bleeding - large amount when having a bowel movement. Occasional specks of blood with bowel movement would not be unusual.  b. Severe abdominal pain  c. Fever or Chills  Any additional instructions: no reason to repeat colonoscopy unless symptoms develop; 2 day bowel prep if another procedure necessary      Instructions given to Sunitha Santo and other family members.

## 2018-08-03 NOTE — ROUTINE PROCESS
VSS. No complaints noted. Education given and reviewed with  who voiced understanding. Pt wheeled out via wheelchair by carolina Spaulding.

## 2018-08-16 ENCOUNTER — HOSPITAL ENCOUNTER (OUTPATIENT)
Dept: MAMMOGRAPHY | Age: 73
Discharge: HOME | End: 2018-08-16
Attending: FAMILY MEDICINE
Payer: MEDICARE

## 2018-08-16 DIAGNOSIS — Z12.31 VISIT FOR SCREENING MAMMOGRAM: ICD-10-CM

## 2018-08-16 PROCEDURE — 77067 SCR MAMMO BI INCL CAD: CPT

## 2018-12-13 ENCOUNTER — HOSPITAL ENCOUNTER (OUTPATIENT)
Dept: GENERAL RADIOLOGY | Age: 73
Discharge: HOME | End: 2018-12-13
Attending: FAMILY MEDICINE
Payer: MEDICARE

## 2018-12-13 DIAGNOSIS — G89.29 CHRONIC RIGHT-SIDED LOW BACK PAIN WITH RIGHT-SIDED SCIATICA: ICD-10-CM

## 2018-12-13 DIAGNOSIS — M54.41 CHRONIC RIGHT-SIDED LOW BACK PAIN WITH RIGHT-SIDED SCIATICA: ICD-10-CM

## 2018-12-13 PROCEDURE — 72100 X-RAY EXAM L-S SPINE 2/3 VWS: CPT

## 2018-12-13 NOTE — PROGRESS NOTES
Desi Ann. Indeed there are findings on the xray that could suggest a nerve is being pinched. If the PT does not help we need to get an MRI.

## 2018-12-13 NOTE — PROGRESS NOTES
Gary Parker. Indeed there are findings on the xray that could suggest a nerve is being pinched. If the PT does not help we need to get an MRI.

## 2018-12-21 ENCOUNTER — HOSPITAL ENCOUNTER (OUTPATIENT)
Dept: PHYSICAL THERAPY | Age: 73
Discharge: HOME | End: 2018-12-21
Attending: FAMILY MEDICINE
Payer: MEDICARE

## 2018-12-21 DIAGNOSIS — G89.29 CHRONIC RIGHT-SIDED LOW BACK PAIN WITH RIGHT-SIDED SCIATICA: ICD-10-CM

## 2018-12-21 DIAGNOSIS — M54.41 CHRONIC RIGHT-SIDED LOW BACK PAIN WITH RIGHT-SIDED SCIATICA: ICD-10-CM

## 2018-12-21 PROCEDURE — G8978 MOBILITY CURRENT STATUS: HCPCS

## 2018-12-21 PROCEDURE — 97110 THERAPEUTIC EXERCISES: CPT

## 2018-12-21 PROCEDURE — 97162 PT EVAL MOD COMPLEX 30 MIN: CPT

## 2018-12-21 PROCEDURE — G8979 MOBILITY GOAL STATUS: HCPCS

## 2018-12-21 NOTE — THERAPY EVALUATION
Luz Nya  : 1945  Primary: Lisa Mejia Medicare o  Secondary:  [address]  [facility] [address] [facility] [address], [address]  [phone]   [phone]          Meghan Wesley Assessment 2018   ICD-10: Treatment Diagnosis: Low back pain (M54.5)  Precautions/Allergies:   Codeine phosphate; Flagyl [metronidazole]; Levaquin [levofloxacin]; and Zithromax [azithromycin]   MD Orders: Evaluate and Treat MEDICAL/REFERRING DIAGNOSIS:  Chronic right-sided low back pain with right-sided sciatica [M54.41, G89.29]   DATE OF ONSET: Chronic  REFERRING PHYSICIAN: Gloria Neves MD  RETURN PHYSICIAN APPOINTMENT: TBD     INITIAL ASSESSMENT:  Ms. Cassandra Fisher presents with decreased mobility, decreased strength, and pain in lower back secondary to degenerative changes. After discussing with patient, she agreed she would benefit from physical therapy to improve above deficits. Please sign this plan of treatment if you concur. Thank you for the opportunity to serve this patient. PROBLEM LIST (Impacting functional limitations):  1. Decreased Strength  2. Decreased ADL/Functional Activities  3. Increased Pain  4. Decreased Activity Tolerance INTERVENTIONS PLANNED: (Treatment may consist of any combination of the following)  1. Cold  2. Electrical Stimulation  3. Heat  4. Home Exercise Program (HEP)  5. Manual Therapy  6. Neuromuscular Re-education/Strengthening  7. Range of Motion (ROM)  8. Therapeutic Activites  9. Therapeutic Exercise/Strengthening   TREATMENT PLAN:  Effective Dates: 2018 TO 3/21/2019 (90 days). Frequency/Duration: 1 time a week every other week for 90 Day(s)  GOALS: (Goals have been discussed and agreed upon with patient.)  Short-Term Functional Goals: Time Frame: 30 days  1. Patient will be independent with home exercise program without exacerbation of symptoms or cueing needed.    2. Patient will be independent with correct sleeping positions and awareness/avoidance of aggravating positions without cueing needed. Discharge Goals: Time Frame: 90 days  1. Patient will be independent with all ADLs with minimal onset of back pain and no deficits with daily tasks. 2. Patient will report no fear avoidance with social or recreational activities due to back pain. 3. Patient will score less than or equal to 8/50 on Modified Oswestry Lower Back Pain Questionnaire with minimal effect of back pain on patient's ability to manage every day life activities. Rehabilitation Potential For Stated Goals: Good  Regarding Pete Alaina Card's therapy, I certify that the treatment plan above will be carried out by a therapist or under their direction. Thank you for this referral,  Tosin Robles PT     Referring Physician Signature: Marine Lam MD              Date                    The information in this section was collected on 12/21/2018 (except where otherwise noted). HISTORY:   History of Present Injury/Illness (Reason for Referral):  Patient reports that she has been having chronic back and leg pain. She reports that it feels like a constant ache from her hip down her leg to about her knee. She reports that she has had an x-ray which did show some degenerative changes in her lower back and discs. She reports that she would like to have some exercises to work on to improve her overall mobility so she can move and sleep better without pain. She reports that she is concerned about insurance so she would like to come for just a few sessions. Past Medical History/Comorbidities:   Ms. Price Ny  has a past medical history of Atypical depressive disorder, Chronic renal disease, Essential hypertension, benign, Glaucoma, Insomnia, unspecified, Iron deficiency anemia secondary to inadequate dietary iron intake, Migraines, Mixed hyperlipidemia, Splenic artery aneurysm ([address] Unspecified hypothyroidism, and Varicella.   Ms. Price Ny has a past surgical history that includes hx hysterectomy; hx tonsil and adenoidectomy; hx cholecystectomy; hx urological; hx refractive surgery; hx colonoscopy (2012); hx endoscopy (2012); hx cataract removal; and COLONOSCOPY / BMI=22 (N/A, 8/3/2018). Social History/Living Environment:   Home Environment: Private residence  Living Alone: No  Support Systems: Family member(s), Friends \ neighbors  Prior Level of Function/Work/Activity:  Independent  Dominant Side:         RIGHT  Personal Factors:          Sex:  female        Age:  75 y.o. Ambulatory/Rehab Services H2 Model Falls Risk Assessment    Risk Factors:       No Risk Factors Identified Ability to Rise from Chair:       (0)  Ability to rise in a single movement    Falls Prevention Plan:       No modifications necessary   Total: (5 or greater = High Risk): 0    ©2010 [facility] [employer]. All Rights Reserved. [facility] Patent #[id number]. Federal Law prohibits the replication, distribution or use without written permission from [facility] [employer]     Current Medications:       Current Outpatient Medications:     amitriptyline (ELAVIL) 75 mg tablet, Take 1 Tab by mouth nightly., Disp: 90 Tab, Rfl: 4    diclofenac (VOLTAREN) 1 % gel, Apply  to affected area four (4) times daily. , Disp: 100 g, Rfl: 0    butalbital-acetaminophen-caffeine (FIORICET, ESGIC) -40 mg per tablet, Take 1 Tab by mouth every four (4) hours as needed for Pain., Disp: 60 Tab, Rfl: 1    omeprazole (PRILOSEC) 20 mg capsule, Take 1 Cap by mouth daily. , Disp: 90 Cap, Rfl: 4    lisinopril (PRINIVIL, ZESTRIL) 20 mg tablet, Take 1 Tab by mouth two (2) times a day., Disp: 180 Tab, Rfl: 4    calcium polycarbophil (FIBER LAXATIVE, CA POLYCARBO,) 625 mg tablet, Take 625 mg by mouth daily. , Disp: , Rfl:     simvastatin (ZOCOR) 40 mg tablet, Take 1 Tab by mouth nightly., Disp: 90 Tab, Rfl: 3    levothyroxine (SYNTHROID) 50 mcg tablet, TAKE 1 TABLET DAILY (BEFORE BREAKFAST). , Disp: 90 Tab, Rfl: 3    aspirin delayed-release 81 mg tablet, Take  by mouth nightly., Disp: , Rfl:    Date Last Reviewed:  2018    Number of Personal Factors/Comorbidities that affect the Plan of Care: 1-2: MODERATE COMPLEXITY   EXAMINATION:   Observation/Orthostatic Postural Assessment:          Posture Assessment: Rounded shoulders, Forward head, Asymmetry, Genu recurvatum left, Genu recurvatum right, Kyphosis, Leg length discrepancy, left, Leg length discrepancy, right, Lumbar shift, Pelvic obliquity, Scoliosis right, Scoliosis left, Trunk flexion   Palpation:          Tenderness to palpation noted along right sacral and gluteal region. Decreased muscular tissue noted in gluteal region bilaterally. Lumbar scoliosis with left concavity noted in lumbar spine. No bruising or swelling noted  ROM:          Lumbar Assessment (AROM):  · Flexion: 25% of full AROM  · Extension: 25% of full AROM  · Right side bendin% of full AROM  · Left side bending[de-identified] 25% of full AROM  · Right rotation: 25% of full AROM  · Left rotation: 25% of full AROM  Strength:          Lumbar Assessment (Strength):  · Core strength: Grossly 3/5 with manual muscle testing  · Multifidus strength: Grossly 3/5 with manual muscle testing  Special Tests:          Negative sit/slump test.  Negative Marcher's test.  Negative forward flexion test.  Neurological Screen:        Dermatomes: Within normal limits        Reflexes:  2+  Functional Mobility:         Independent   Body Structures Involved:  1. Nerves  2. Muscles Body Functions Affected:  1. Neuromusculoskeletal  2. Movement Related Activities and Participation Affected:  1. Mobility  2. Self Care   Number of elements (examined above) that affect the Plan of Care: 4+: HIGH COMPLEXITY   CLINICAL PRESENTATION:   Presentation: Evolving clinical presentation with changing clinical characteristics: MODERATE COMPLEXITY   CLINICAL DECISION MAKING:   Outcome Measure:    Tool Used: Modified Oswestry Low Back Pain Questionnaire  Score:  Initial: 12/50  Most Recent: X/50 (Date: -- )   Interpretation of Score: Each section is scored on a 0-5 scale, 5 representing the greatest disability. The scores of each section are added together for a total score of 50. Score 0 1-10 11-20 21-30 31-40 41-49 50   Modifier CH CI CJ CK CL CM CN     ? Mobility - Walking and Moving Around:     - CURRENT STATUS: CJ - 20%-39% impaired, limited or restricted    - GOAL STATUS: CI - 1%-19% impaired, limited or restricted    - D/C STATUS:  ---------------To be determined---------------    Medical Necessity:   · Patient is expected to demonstrate progress in strength and range of motion to increase independence with daily activities. · Patient demonstrates good rehab potential due to higher previous functional level. · Skilled intervention continues to be required due to decreased mobility. Reason for Services/Other Comments:  · Patient continues to demonstrate capacity to improve overall mobility which will increase independence and increase safety. Use of outcome tool(s) and clinical judgement create a POC that gives a: Questionable prediction of patient's progress: MODERATE COMPLEXITY            TREATMENT:   (In addition to Assessment/Re-Assessment sessions the following treatments were rendered)  Pre-treatment Symptoms/Complaints:  12/21/2018: Patient reports she is doing ok right now, but she always has an ache in her back and down her leg. Pain: Initial:   Pain Intensity 1: 5  Pain Location 1: Back  Pain Orientation 1: Right, Lower  Pain Intervention(s) 1: Rest, Medication (see MAR)  Post Session:  4/10     THERAPEUTIC EXERCISE: (15 minutes):  Exercises per grid below to improve mobility and strength. Required minimal verbal and manual cues to promote proper body alignment, promote proper body posture and promote proper body mechanics.   Progressed resistance, range, repetitions and complexity of movement as indicated. Date:  12/21/2018   Activity/Exercise Parameters   Pelvic tilts HEP   Lower trunk rotation HEP   Active hamstring stretch with ankle pumps HEP      Treatment/Session Assessment:    · Response to Treatment:  Patient tolerated assessment without complaints of increased back pain. Patient verbalized and demonstrated understanding of HEP. · Compliance with Program/Exercises: Will assess as treatment progresses. · Recommendations/Intent for next treatment session: \"Next visit will focus on advancements to more challenging activities\". Total Treatment Duration:  PT Patient Time In/Time Out  Time In: 1430  Time Out: [address], PT    No future appointments.

## 2019-05-30 NOTE — THERAPY DISCHARGE
Aime Bush  : 1945  Primary: Aryan Mejia Medicare Hmo  Secondary:  [address] at [place] [id number] [facility], [address] [place] [address],[address], [place] [address].  [phone]   [phone]          OUTPATIENT PHYSICAL THERAPY:Discontinuation Summary 2018   ICD-10: Treatment Diagnosis: Low back pain (M54.5)  Precautions/Allergies:   Codeine phosphate; Flagyl [metronidazole]; Levaquin [levofloxacin]; and Zithromax [azithromycin]   MD Orders: Evaluate and Treat MEDICAL/REFERRING DIAGNOSIS:  Chronic right-sided low back pain with right-sided sciatica [M54.41, G89.29]   DATE OF ONSET: Chronic  REFERRING PHYSICIAN: Piper Bee MD  RETURN PHYSICIAN APPOINTMENT: TBD     ASSESSMENT:  Ms. Noman Cole presented with decreased mobility, decreased strength, and pain in lower back secondary to degenerative changes. Patient did not attend any additional physical therapy visits secondary to unknown reasons. Patient's therapy will be discontinued at this time. We will be happy to re-assess her with a change in her status and a new order from her doctor. Thank you for the opportunity to serve this patient. GOALS: (Goals have been discussed and agreed upon with patient.)  Short-Term Functional Goals: Time Frame: 30 days  1. Patient will be independent with home exercise program without exacerbation of symptoms or cueing needed--goal unable to be assessed. 2. Patient will be independent with correct sleeping positions and awareness/avoidance of aggravating positions without cueing needed--goal unable to be assessed. Discharge Goals: Time Frame: 90 days  1. Patient will be independent with all ADLs with minimal onset of back pain and no deficits with daily tasks--goal unable to be assessed. 2. Patient will report no fear avoidance with social or recreational activities due to back pain --goal unable to be assessed.   3. Patient will score less than or equal to 8/50 on Modified Oswestry Lower Back Pain Questionnaire with minimal effect of back pain on patient's ability to manage every day life activities--goal unable to be assessed. Thank you for this referral,  Tosin Robles, PT                 EXAMINATION:   Observation/Orthostatic Postural Assessment:          Posture Assessment: Rounded shoulders, Forward head, Asymmetry, Genu recurvatum left, Genu recurvatum right, Kyphosis, Leg length discrepancy, left, Leg length discrepancy, right, Lumbar shift, Pelvic obliquity, Scoliosis right, Scoliosis left, Trunk flexion   Palpation:          Tenderness to palpation noted along right sacral and gluteal region. Decreased muscular tissue noted in gluteal region bilaterally. Lumbar scoliosis with left concavity noted in lumbar spine. No bruising or swelling noted  ROM:          Lumbar Assessment (AROM):  · Flexion: 25% of full AROM  · Extension: 25% of full AROM  · Right side bendin% of full AROM  · Left side bending[de-identified] 25% of full AROM  · Right rotation: 25% of full AROM  · Left rotation: 25% of full AROM  Strength:          Lumbar Assessment (Strength):  · Core strength: Grossly 3/5 with manual muscle testing  · Multifidus strength: Grossly 3/5 with manual muscle testing  Special Tests:          Negative sit/slump test.  Negative Marcher's test.  Negative forward flexion test.  Neurological Screen:        Dermatomes: Within normal limits        Reflexes:  2+  Functional Mobility:         Independent   CLINICAL DECISION MAKING:   Outcome Measure: Tool Used: Modified Oswestry Low Back Pain Questionnaire  Score:  Initial:   Most Recent:  (Date: -- )   Interpretation of Score: Each section is scored on a 0-5 scale, 5 representing the greatest disability. The scores of each section are added together for a total score of 50.     Score 0 1-10 11-20 21-30 31-40 41-49 50   Modifier CH CI CJ CK CL CM CN

## 2019-08-20 ENCOUNTER — HOSPITAL ENCOUNTER (OUTPATIENT)
Dept: MAMMOGRAPHY | Age: 74
Discharge: HOME | End: 2019-08-20
Attending: FAMILY MEDICINE
Payer: MEDICARE

## 2019-08-20 DIAGNOSIS — Z12.31 VISIT FOR SCREENING MAMMOGRAM: ICD-10-CM

## 2019-08-20 PROCEDURE — 77067 SCR MAMMO BI INCL CAD: CPT

## 2020-09-29 ENCOUNTER — HOSPITAL ENCOUNTER (OUTPATIENT)
Dept: MAMMOGRAPHY | Age: 75
Discharge: HOME | End: 2020-09-29
Attending: FAMILY MEDICINE
Payer: MEDICARE

## 2020-09-29 DIAGNOSIS — Z12.31 VISIT FOR SCREENING MAMMOGRAM: ICD-10-CM

## 2020-09-29 DIAGNOSIS — Z78.0 POSTMENOPAUSAL STATE: ICD-10-CM

## 2020-09-29 PROCEDURE — 77080 DXA BONE DENSITY AXIAL: CPT

## 2020-09-29 PROCEDURE — 77067 SCR MAMMO BI INCL CAD: CPT

## 2021-08-31 ENCOUNTER — TRANSCRIBE ORDER (OUTPATIENT)
Dept: SCHEDULING | Age: 76
End: 2021-08-31

## 2021-08-31 DIAGNOSIS — Z12.31 VISIT FOR SCREENING MAMMOGRAM: Primary | ICD-10-CM

## 2021-10-01 ENCOUNTER — HOSPITAL ENCOUNTER (OUTPATIENT)
Dept: MAMMOGRAPHY | Age: 76
Discharge: HOME | End: 2021-10-01
Attending: FAMILY MEDICINE
Payer: MEDICARE

## 2021-10-01 DIAGNOSIS — Z12.31 VISIT FOR SCREENING MAMMOGRAM: ICD-10-CM

## 2021-10-01 PROCEDURE — 77067 SCR MAMMO BI INCL CAD: CPT

## 2022-08-24 ENCOUNTER — APPOINTMENT (RX ONLY)
Dept: URBAN - METROPOLITAN AREA CLINIC 329 | Facility: CLINIC | Age: 77
Setting detail: DERMATOLOGY
End: 2022-08-24

## 2022-08-24 DIAGNOSIS — L82.1 OTHER SEBORRHEIC KERATOSIS: ICD-10-CM

## 2022-08-24 DIAGNOSIS — D22 MELANOCYTIC NEVI: ICD-10-CM

## 2022-08-24 DIAGNOSIS — Z71.89 OTHER SPECIFIED COUNSELING: ICD-10-CM

## 2022-08-24 DIAGNOSIS — D18.0 HEMANGIOMA: ICD-10-CM

## 2022-08-24 DIAGNOSIS — L82.0 INFLAMED SEBORRHEIC KERATOSIS: ICD-10-CM

## 2022-08-24 PROBLEM — D22.62 MELANOCYTIC NEVI OF LEFT UPPER LIMB, INCLUDING SHOULDER: Status: ACTIVE | Noted: 2022-08-24

## 2022-08-24 PROBLEM — D22.5 MELANOCYTIC NEVI OF TRUNK: Status: ACTIVE | Noted: 2022-08-24

## 2022-08-24 PROBLEM — D18.01 HEMANGIOMA OF SKIN AND SUBCUTANEOUS TISSUE: Status: ACTIVE | Noted: 2022-08-24

## 2022-08-24 PROCEDURE — ? COUNSELING

## 2022-08-24 PROCEDURE — 99203 OFFICE O/P NEW LOW 30 MIN: CPT | Mod: 25

## 2022-08-24 PROCEDURE — ? ADDITIONAL NOTES

## 2022-08-24 PROCEDURE — ? TREATMENT REGIMEN

## 2022-08-24 PROCEDURE — ? LIQUID NITROGEN

## 2022-08-24 PROCEDURE — 17110 DESTRUCTION B9 LES UP TO 14: CPT

## 2022-08-24 ASSESSMENT — LOCATION DETAILED DESCRIPTION DERM
LOCATION DETAILED: LEFT RIB CAGE
LOCATION DETAILED: RIGHT PROXIMAL DORSAL FOREARM
LOCATION DETAILED: SUPERIOR LUMBAR SPINE
LOCATION DETAILED: RIGHT VENTRAL PROXIMAL FOREARM
LOCATION DETAILED: RIGHT MID-UPPER BACK
LOCATION DETAILED: LEFT SUPERIOR LATERAL UPPER BACK
LOCATION DETAILED: LEFT PROXIMAL RADIAL DORSAL FOREARM
LOCATION DETAILED: RIGHT CLAVICULAR SKIN

## 2022-08-24 ASSESSMENT — LOCATION SIMPLE DESCRIPTION DERM
LOCATION SIMPLE: LEFT UPPER BACK
LOCATION SIMPLE: LOWER BACK
LOCATION SIMPLE: RIGHT CLAVICULAR SKIN
LOCATION SIMPLE: LEFT FOREARM
LOCATION SIMPLE: ABDOMEN
LOCATION SIMPLE: RIGHT UPPER BACK
LOCATION SIMPLE: RIGHT FOREARM

## 2022-08-24 ASSESSMENT — LOCATION ZONE DERM
LOCATION ZONE: ARM
LOCATION ZONE: TRUNK

## 2022-08-24 NOTE — PROCEDURE: LIQUID NITROGEN
Render Post-Care Instructions In Note?: no
Show Spray Paint Technique Variable?: Yes
Spray Paint Text: The liquid nitrogen was applied to the skin utilizing a spray paint frosting technique.
Medical Necessity Clause: This procedure was medically necessary because the lesions that were treated were:
Consent: The patient's consent was obtained including but not limited to risks of crusting, scabbing, blistering, scarring, darker or lighter pigmentary change, recurrence, incomplete removal and infection.
Medical Necessity Information: It is in your best interest to select a reason for this procedure from the list below. All of these items fulfill various CMS LCD requirements except the new and changing color options.
Detail Level: Detailed
Post-Care Instructions: I reviewed with the patient in detail post-care instructions. Patient is to wear sunprotection, and avoid picking at any of the treated lesions. Pt may apply Vaseline to crusted or scabbing areas.

## 2022-10-03 ENCOUNTER — HOSPITAL ENCOUNTER (OUTPATIENT)
Dept: MAMMOGRAPHY | Age: 77
Discharge: HOME | End: 2022-10-06
Payer: MEDICARE

## 2022-10-03 DIAGNOSIS — Z12.31 SCREENING MAMMOGRAM FOR HIGH-RISK PATIENT: ICD-10-CM

## 2022-10-03 PROCEDURE — 77063 BREAST TOMOSYNTHESIS BI: CPT

## 2023-10-24 ENCOUNTER — APPOINTMENT (RX ONLY)
Dept: URBAN - METROPOLITAN AREA CLINIC 329 | Facility: CLINIC | Age: 78
Setting detail: DERMATOLOGY
End: 2023-10-24

## 2023-10-24 DIAGNOSIS — L82.1 OTHER SEBORRHEIC KERATOSIS: ICD-10-CM

## 2023-10-24 DIAGNOSIS — L85.3 XEROSIS CUTIS: ICD-10-CM

## 2023-10-24 DIAGNOSIS — D18.0 HEMANGIOMA: ICD-10-CM

## 2023-10-24 DIAGNOSIS — L81.4 OTHER MELANIN HYPERPIGMENTATION: ICD-10-CM

## 2023-10-24 DIAGNOSIS — D22 MELANOCYTIC NEVI: ICD-10-CM

## 2023-10-24 PROBLEM — D22.5 MELANOCYTIC NEVI OF TRUNK: Status: ACTIVE | Noted: 2023-10-24

## 2023-10-24 PROBLEM — D18.01 HEMANGIOMA OF SKIN AND SUBCUTANEOUS TISSUE: Status: ACTIVE | Noted: 2023-10-24

## 2023-10-24 PROCEDURE — ? SUNSCREEN RECOMMENDATIONS

## 2023-10-24 PROCEDURE — ? COUNSELING

## 2023-10-24 PROCEDURE — 99213 OFFICE O/P EST LOW 20 MIN: CPT

## 2023-10-24 ASSESSMENT — LOCATION DETAILED DESCRIPTION DERM
LOCATION DETAILED: SUPERIOR THORACIC SPINE
LOCATION DETAILED: EPIGASTRIC SKIN
LOCATION DETAILED: RIGHT MEDIAL BREAST 1-2:00 REGION
LOCATION DETAILED: INFERIOR THORACIC SPINE
LOCATION DETAILED: RIGHT SUPERIOR UPPER BACK
LOCATION DETAILED: RIGHT SUPERIOR MEDIAL UPPER BACK

## 2023-10-24 ASSESSMENT — LOCATION SIMPLE DESCRIPTION DERM
LOCATION SIMPLE: RIGHT BREAST
LOCATION SIMPLE: ABDOMEN
LOCATION SIMPLE: RIGHT UPPER BACK
LOCATION SIMPLE: UPPER BACK

## 2023-10-24 ASSESSMENT — LOCATION ZONE DERM: LOCATION ZONE: TRUNK

## 2023-12-22 ENCOUNTER — HOSPITAL ENCOUNTER (OUTPATIENT)
Dept: MAMMOGRAPHY | Age: 78
Discharge: HOME | End: 2023-12-25
Payer: MEDICARE

## 2023-12-22 VITALS — WEIGHT: 96 LBS | BODY MASS INDEX: 18.85 KG/M2 | HEIGHT: 60 IN

## 2023-12-22 DIAGNOSIS — Z12.31 VISIT FOR SCREENING MAMMOGRAM: ICD-10-CM

## 2023-12-22 PROCEDURE — 77063 BREAST TOMOSYNTHESIS BI: CPT

## 2024-01-23 ENCOUNTER — HOSPITAL ENCOUNTER (OUTPATIENT)
Dept: MAMMOGRAPHY | Age: 79
Discharge: HOME | End: 2024-01-26
Payer: MEDICARE

## 2024-01-23 DIAGNOSIS — R92.8 ABNORMAL MAMMOGRAM: ICD-10-CM

## 2024-01-23 PROCEDURE — G0279 TOMOSYNTHESIS, MAMMO: HCPCS

## 2024-01-23 PROCEDURE — 76642 ULTRASOUND BREAST LIMITED: CPT

## 2024-10-31 ENCOUNTER — APPOINTMENT (RX ONLY)
Dept: URBAN - METROPOLITAN AREA CLINIC 329 | Facility: CLINIC | Age: 79
Setting detail: DERMATOLOGY
End: 2024-10-31

## 2024-10-31 DIAGNOSIS — D22 MELANOCYTIC NEVI: ICD-10-CM

## 2024-10-31 DIAGNOSIS — D18.0 HEMANGIOMA: ICD-10-CM

## 2024-10-31 DIAGNOSIS — L85.3 XEROSIS CUTIS: ICD-10-CM

## 2024-10-31 DIAGNOSIS — L82.1 OTHER SEBORRHEIC KERATOSIS: ICD-10-CM

## 2024-10-31 DIAGNOSIS — L81.4 OTHER MELANIN HYPERPIGMENTATION: ICD-10-CM

## 2024-10-31 PROBLEM — D22.5 MELANOCYTIC NEVI OF TRUNK: Status: ACTIVE | Noted: 2024-10-31

## 2024-10-31 PROBLEM — D18.01 HEMANGIOMA OF SKIN AND SUBCUTANEOUS TISSUE: Status: ACTIVE | Noted: 2024-10-31

## 2024-10-31 PROCEDURE — ? COUNSELING

## 2024-10-31 PROCEDURE — ? SUNSCREEN RECOMMENDATIONS

## 2024-10-31 PROCEDURE — 99213 OFFICE O/P EST LOW 20 MIN: CPT

## 2024-10-31 ASSESSMENT — LOCATION DETAILED DESCRIPTION DERM
LOCATION DETAILED: RIGHT SUPERIOR MEDIAL UPPER BACK
LOCATION DETAILED: SUPERIOR THORACIC SPINE
LOCATION DETAILED: INFERIOR THORACIC SPINE
LOCATION DETAILED: RIGHT SUPERIOR UPPER BACK
LOCATION DETAILED: RIGHT MEDIAL BREAST 1-2:00 REGION
LOCATION DETAILED: EPIGASTRIC SKIN

## 2024-10-31 ASSESSMENT — LOCATION SIMPLE DESCRIPTION DERM
LOCATION SIMPLE: RIGHT BREAST
LOCATION SIMPLE: RIGHT UPPER BACK
LOCATION SIMPLE: ABDOMEN
LOCATION SIMPLE: UPPER BACK

## 2024-10-31 ASSESSMENT — LOCATION ZONE DERM: LOCATION ZONE: TRUNK

## 2024-12-31 ENCOUNTER — TRANSCRIBE ORDERS (OUTPATIENT)
Dept: SCHEDULING | Age: 79
End: 2024-12-31

## 2024-12-31 DIAGNOSIS — Z12.31 OTHER SCREENING MAMMOGRAM: Primary | ICD-10-CM

## 2025-01-29 ENCOUNTER — OFFICE VISIT (OUTPATIENT)
Dept: UROLOGY | Age: 80
End: 2025-01-29
Payer: MEDICARE

## 2025-01-29 DIAGNOSIS — N39.0 RECURRENT UTI: Primary | ICD-10-CM

## 2025-01-29 DIAGNOSIS — N95.2 VAGINAL ATROPHY: ICD-10-CM

## 2025-01-29 LAB
BILIRUBIN, URINE, POC: NEGATIVE
BLOOD URINE, POC: NEGATIVE
GLUCOSE URINE, POC: NEGATIVE MG/DL
KETONES, URINE, POC: NEGATIVE MG/DL
LEUKOCYTE ESTERASE, URINE, POC: NORMAL
NITRITE, URINE, POC: NEGATIVE
PH, URINE, POC: 5.5 (ref 4.6–8)
PROTEIN,URINE, POC: NEGATIVE MG/DL
PVR, POC: 44 CC
SPECIFIC GRAVITY, URINE, POC: 1.02 (ref 1–1.03)
URINALYSIS CLARITY, POC: NORMAL
URINALYSIS COLOR, POC: NORMAL
UROBILINOGEN, POC: NORMAL MG/DL

## 2025-01-29 PROCEDURE — G8399 PT W/DXA RESULTS DOCUMENT: HCPCS | Performed by: NURSE PRACTITIONER

## 2025-01-29 PROCEDURE — G8427 DOCREV CUR MEDS BY ELIG CLIN: HCPCS | Performed by: NURSE PRACTITIONER

## 2025-01-29 PROCEDURE — 99204 OFFICE O/P NEW MOD 45 MIN: CPT | Performed by: NURSE PRACTITIONER

## 2025-01-29 PROCEDURE — 81003 URINALYSIS AUTO W/O SCOPE: CPT | Performed by: NURSE PRACTITIONER

## 2025-01-29 PROCEDURE — 1090F PRES/ABSN URINE INCON ASSESS: CPT | Performed by: NURSE PRACTITIONER

## 2025-01-29 PROCEDURE — 1036F TOBACCO NON-USER: CPT | Performed by: NURSE PRACTITIONER

## 2025-01-29 PROCEDURE — 1123F ACP DISCUSS/DSCN MKR DOCD: CPT | Performed by: NURSE PRACTITIONER

## 2025-01-29 PROCEDURE — 1159F MED LIST DOCD IN RCRD: CPT | Performed by: NURSE PRACTITIONER

## 2025-01-29 PROCEDURE — 51798 US URINE CAPACITY MEASURE: CPT | Performed by: NURSE PRACTITIONER

## 2025-01-29 PROCEDURE — G8420 CALC BMI NORM PARAMETERS: HCPCS | Performed by: NURSE PRACTITIONER

## 2025-01-29 RX ORDER — ESTRADIOL 0.1 MG/G
CREAM VAGINAL
Qty: 42.5 G | Refills: 3 | Status: SHIPPED | OUTPATIENT
Start: 2025-01-29

## 2025-01-29 RX ORDER — GINSENG 100 MG
1 CAPSULE ORAL DAILY
Qty: 90 CAPSULE | Refills: 3 | Status: SHIPPED | OUTPATIENT
Start: 2025-01-29

## 2025-01-29 NOTE — PROGRESS NOTES
Strain     Difficulty Paying Living Expenses: Not hard at all     Difficulty Paying Medical Expenses: No   Food Insecurity: No Food Insecurity (8/23/2023)    Received from [employer]    Food Insecurity     Worried about Running Out of Food in the Last Year: Never true     Ran Out of Food in the Last Year: Never true   Transportation Needs: No Transportation Needs (8/23/2023)    Received from [employer]    Transportation Needs     Lack of Transportation: No   Physical Activity: Inactive (7/20/2022)    Received from [employer]    Physical Activity     Days of Exercise per Week: 0     Minutes of Exercise per Session: 0     Total Minutes of Exercise per Week: 0   Stress: No Stress Concern Present (8/23/2023)    Received from [employer]    Stress     Feeling of Stress : Not at all   Social Connections: Moderately Integrated (8/23/2023)    Received from [employer]    Social Connections     Frequency of Communication with Friends and Family: More than three times a week     Frequency of Social Gatherings with Friends and Family: Once a week   Intimate Partner Violence: Not At Risk (8/23/2023)    Received from [employer]    Intimate Partner Violence     Fear of Current or Ex-Partner: No     Emotionally Abused: No     Physically Abused: No     Sexually Abused: No   Housing Stability: Not At Risk (8/23/2023)    Received from [employer]    Housing Stability     Was there a time when you did not have a steady place to sleep: No     Worried that the place you are staying is making you sick: No     Family History   Problem Relation Age of Onset    Heart Failure Mother         CHF    Stroke Father     Breast Cancer Maternal Aunt 55       ROS    Urinalysis  UA - Dipstick  Results for orders placed or performed in visit on 01/29/25   AMB POC URINALYSIS DIP STICK AUTO W/O MICRO   Result Value Ref Range

## 2025-02-07 ENCOUNTER — HOSPITAL ENCOUNTER (OUTPATIENT)
Dept: MAMMOGRAPHY | Age: 80
Discharge: HOME | End: 2025-02-10
Payer: MEDICARE

## 2025-02-07 VITALS — WEIGHT: 95 LBS | BODY MASS INDEX: 17.94 KG/M2 | HEIGHT: 61 IN

## 2025-02-07 DIAGNOSIS — Z12.31 OTHER SCREENING MAMMOGRAM: ICD-10-CM

## 2025-02-07 PROCEDURE — 77067 SCR MAMMO BI INCL CAD: CPT

## 2025-02-10 ENCOUNTER — HOSPITAL ENCOUNTER (OUTPATIENT)
Dept: ULTRASOUND IMAGING | Age: 80
Discharge: HOME | End: 2025-02-13
Payer: MEDICARE

## 2025-02-10 DIAGNOSIS — N39.0 RECURRENT UTI: ICD-10-CM

## 2025-02-10 PROCEDURE — 76770 US EXAM ABDO BACK WALL COMP: CPT

## 2025-03-03 ENCOUNTER — APPOINTMENT (OUTPATIENT)
Dept: GENERAL RADIOLOGY | Age: 80
End: 2025-03-03
Payer: MEDICARE

## 2025-03-03 ENCOUNTER — APPOINTMENT (OUTPATIENT)
Dept: CT IMAGING | Age: 80
End: 2025-03-03
Payer: MEDICARE

## 2025-03-03 ENCOUNTER — HOSPITAL ENCOUNTER (EMERGENCY)
Age: 80
Discharge: HOME | End: 2025-03-04
Attending: STUDENT IN AN ORGANIZED HEALTH CARE EDUCATION/TRAINING PROGRAM
Payer: MEDICARE

## 2025-03-03 DIAGNOSIS — R07.81 RIB PAIN ON LEFT SIDE: Primary | ICD-10-CM

## 2025-03-03 DIAGNOSIS — T14.8XXA BRUISING: ICD-10-CM

## 2025-03-03 DIAGNOSIS — R05.1 ACUTE COUGH: ICD-10-CM

## 2025-03-03 LAB
ALBUMIN SERPL-MCNC: 3.2 G/DL (ref 3.2–4.6)
ALBUMIN/GLOB SERPL: 1 (ref 1–1.9)
ALP SERPL-CCNC: 104 U/L (ref 35–104)
ALT SERPL-CCNC: 19 U/L (ref 8–45)
ANION GAP SERPL CALC-SCNC: 10 MMOL/L (ref 7–16)
AST SERPL-CCNC: 30 U/L (ref 15–37)
BASOPHILS # BLD: 0.02 K/UL (ref 0–0.2)
BASOPHILS NFR BLD: 0.3 % (ref 0–2)
BILIRUB SERPL-MCNC: <0.2 MG/DL (ref 0–1.2)
BUN SERPL-MCNC: 27 MG/DL (ref 8–23)
CALCIUM SERPL-MCNC: 9.3 MG/DL (ref 8.8–10.2)
CHLORIDE SERPL-SCNC: 95 MMOL/L (ref 98–107)
CO2 SERPL-SCNC: 30 MMOL/L (ref 20–29)
CREAT SERPL-MCNC: 1.56 MG/DL (ref 0.6–1.1)
DIFFERENTIAL METHOD BLD: ABNORMAL
EOSINOPHIL # BLD: 0.24 K/UL (ref 0–0.8)
EOSINOPHIL NFR BLD: 3.2 % (ref 0.5–7.8)
ERYTHROCYTE [DISTWIDTH] IN BLOOD BY AUTOMATED COUNT: 13 % (ref 11.9–14.6)
GLOBULIN SER CALC-MCNC: 3.1 G/DL (ref 2.3–3.5)
GLUCOSE SERPL-MCNC: 128 MG/DL (ref 70–99)
HCT VFR BLD AUTO: 33.9 % (ref 35.8–46.3)
HGB BLD-MCNC: 11.3 G/DL (ref 11.7–15.4)
IMM GRANULOCYTES # BLD AUTO: 0.08 K/UL (ref 0–0.5)
IMM GRANULOCYTES NFR BLD AUTO: 1.1 % (ref 0–5)
LIPASE SERPL-CCNC: 50 U/L (ref 13–60)
LYMPHOCYTES # BLD: 2.77 K/UL (ref 0.5–4.6)
LYMPHOCYTES NFR BLD: 37.2 % (ref 13–44)
MCH RBC QN AUTO: 31.5 PG (ref 26.1–32.9)
MCHC RBC AUTO-ENTMCNC: 33.3 G/DL (ref 31.4–35)
MCV RBC AUTO: 94.4 FL (ref 82–102)
MONOCYTES # BLD: 0.68 K/UL (ref 0.1–1.3)
MONOCYTES NFR BLD: 9.1 % (ref 4–12)
NEUTS SEG # BLD: 3.65 K/UL (ref 1.7–8.2)
NEUTS SEG NFR BLD: 49.1 % (ref 43–78)
NRBC # BLD: 0 K/UL (ref 0–0.2)
PLATELET # BLD AUTO: 178 K/UL (ref 150–450)
PMV BLD AUTO: 10 FL (ref 9.4–12.3)
POTASSIUM SERPL-SCNC: 3.5 MMOL/L (ref 3.5–5.1)
PROT SERPL-MCNC: 6.3 G/DL (ref 6.3–8.2)
RBC # BLD AUTO: 3.59 M/UL (ref 4.05–5.2)
SODIUM SERPL-SCNC: 135 MMOL/L (ref 136–145)
WBC # BLD AUTO: 7.4 K/UL (ref 4.3–11.1)

## 2025-03-03 PROCEDURE — 6360000002 HC RX W HCPCS: Performed by: PHYSICIAN ASSISTANT

## 2025-03-03 PROCEDURE — 71046 X-RAY EXAM CHEST 2 VIEWS: CPT

## 2025-03-03 PROCEDURE — 6360000004 HC RX CONTRAST MEDICATION: Performed by: PHYSICIAN ASSISTANT

## 2025-03-03 PROCEDURE — 74177 CT ABD & PELVIS W/CONTRAST: CPT

## 2025-03-03 PROCEDURE — 96374 THER/PROPH/DIAG INJ IV PUSH: CPT

## 2025-03-03 PROCEDURE — 99285 EMERGENCY DEPT VISIT HI MDM: CPT

## 2025-03-03 PROCEDURE — 85025 COMPLETE CBC W/AUTO DIFF WBC: CPT

## 2025-03-03 PROCEDURE — 83690 ASSAY OF LIPASE: CPT

## 2025-03-03 PROCEDURE — 6370000000 HC RX 637 (ALT 250 FOR IP): Performed by: PHYSICIAN ASSISTANT

## 2025-03-03 PROCEDURE — 80053 COMPREHEN METABOLIC PANEL: CPT

## 2025-03-03 RX ORDER — IOPAMIDOL 755 MG/ML
100 INJECTION, SOLUTION INTRAVASCULAR
Status: COMPLETED | OUTPATIENT
Start: 2025-03-03 | End: 2025-03-03

## 2025-03-03 RX ORDER — BENZONATATE 100 MG/1
200 CAPSULE ORAL
Status: COMPLETED | OUTPATIENT
Start: 2025-03-03 | End: 2025-03-03

## 2025-03-03 RX ORDER — KETOROLAC TROMETHAMINE 15 MG/ML
15 INJECTION, SOLUTION INTRAMUSCULAR; INTRAVENOUS
Status: COMPLETED | OUTPATIENT
Start: 2025-03-03 | End: 2025-03-03

## 2025-03-03 RX ADMIN — IOPAMIDOL 100 ML: 755 INJECTION, SOLUTION INTRAVENOUS at 23:05

## 2025-03-03 RX ADMIN — KETOROLAC TROMETHAMINE 15 MG: 15 INJECTION, SOLUTION INTRAMUSCULAR; INTRAVENOUS at 23:01

## 2025-03-03 RX ADMIN — BENZONATATE 200 MG: 100 CAPSULE ORAL at 23:02

## 2025-03-03 ASSESSMENT — LIFESTYLE VARIABLES
HOW MANY STANDARD DRINKS CONTAINING ALCOHOL DO YOU HAVE ON A TYPICAL DAY: PATIENT DOES NOT DRINK
HOW OFTEN DO YOU HAVE A DRINK CONTAINING ALCOHOL: NEVER

## 2025-03-03 ASSESSMENT — PAIN SCALES - GENERAL: PAINLEVEL_OUTOF10: 5

## 2025-03-03 ASSESSMENT — PAIN - FUNCTIONAL ASSESSMENT: PAIN_FUNCTIONAL_ASSESSMENT: 0-10

## 2025-03-04 VITALS
OXYGEN SATURATION: 98 % | HEIGHT: 60 IN | HEART RATE: 71 BPM | RESPIRATION RATE: 16 BRPM | BODY MASS INDEX: 18.65 KG/M2 | WEIGHT: 95 LBS | SYSTOLIC BLOOD PRESSURE: 126 MMHG | TEMPERATURE: 97 F | DIASTOLIC BLOOD PRESSURE: 63 MMHG

## 2025-03-04 PROCEDURE — 6370000000 HC RX 637 (ALT 250 FOR IP): Performed by: STUDENT IN AN ORGANIZED HEALTH CARE EDUCATION/TRAINING PROGRAM

## 2025-03-04 RX ORDER — HYDROCODONE BITARTRATE AND ACETAMINOPHEN 5; 325 MG/1; MG/1
1 TABLET ORAL EVERY 8 HOURS PRN
Qty: 9 TABLET | Refills: 0 | Status: SHIPPED | OUTPATIENT
Start: 2025-03-04 | End: 2025-03-07

## 2025-03-04 RX ORDER — ONDANSETRON 4 MG/1
4 TABLET, ORALLY DISINTEGRATING ORAL 3 TIMES DAILY PRN
Qty: 10 TABLET | Refills: 0 | Status: SHIPPED | OUTPATIENT
Start: 2025-03-04

## 2025-03-04 RX ORDER — ONDANSETRON 4 MG/1
4 TABLET, ORALLY DISINTEGRATING ORAL
Status: COMPLETED | OUTPATIENT
Start: 2025-03-04 | End: 2025-03-04

## 2025-03-04 RX ORDER — HYDROCODONE BITARTRATE AND ACETAMINOPHEN 5; 325 MG/1; MG/1
1 TABLET ORAL
Status: COMPLETED | OUTPATIENT
Start: 2025-03-04 | End: 2025-03-04

## 2025-03-04 RX ADMIN — ONDANSETRON 4 MG: 4 TABLET, ORALLY DISINTEGRATING ORAL at 01:17

## 2025-03-04 RX ADMIN — HYDROCODONE BITARTRATE AND ACETAMINOPHEN 1 TABLET: 5; 325 TABLET ORAL at 01:18

## 2025-03-04 ASSESSMENT — PAIN SCALES - GENERAL: PAINLEVEL_OUTOF10: 10

## 2025-03-04 ASSESSMENT — PAIN DESCRIPTION - LOCATION: LOCATION: ABDOMEN

## 2025-03-04 NOTE — ED PROVIDER NOTES
Emergency Department Provider Note       PCP: Shannon Villanueva MD   Age: 79 y.o.   Sex: female     DISPOSITION      ICD-10-CM    1. Abdominal pain, left upper quadrant  R10.12           Medical Decision Making     In summary, Mary Anne Tracy is a 79 y.o. female who presented to the emergency department today with a chief complaint of left upper quadrant pain with a palpable mass. 10:24 PM EST The patient's care will be transitioned to Dr. Galarza.  At this time, the plan is await further lab testing which includes CBC, CMP, lipase, urinalysis with reflex, chest x-ray, CT abdomen and pelvis.  She was also ordered Tessalon Perles and Toradol for the cough and pain please see that provider's documentation for further information.           1 acute, uncomplicated illness or injury.  Shared medical decision making was utilized in creating the patients health plan today.  I independently ordered and reviewed each unique test.                         History     Mary Anne Tracy is a 79 y.o. female who presents to the ED today, with  and son, with a chief complaint of left upper quadrant pain worse when she coughs, Friday morning. She has a past medical history of chronic renal disease and hypertension.  She states she has got left rib and abdominal pain since Friday morning.  She was recently diagnosed with strep throat and given Augmentin.  She has had persistent coughing which considerably makes her abdominal pain worse.  She states this is productive in nature.  She denies any fevers or chills nausea or vomiting.  She denies any constipation bladder or bowel changes.  She notes no shortness of breath or chest pain at this time nothing that she has taken or tried has made her pain better but movement and lying flat does make it worse.          The history is provided by the patient. No  was used.       ROS     Review of Systems     Physical Exam     Vitals signs and nursing note

## 2025-03-04 NOTE — DISCHARGE INSTRUCTIONS
Continue taking Tessalon Perles and over-the-counter cough and cold medication for symptoms.  Take Norco as needed for rib pain.  Hydrocodone does act as a cough suppressant as well.  Use caution as medication is narcotic and can be sedating.  Take Zofran for nausea and vomiting.  Follow-up with family physician within a week.  Return to the ER for worsening or worrisome symptoms.

## 2025-03-19 ENCOUNTER — OFFICE VISIT (OUTPATIENT)
Dept: UROLOGY | Age: 80
End: 2025-03-19
Payer: MEDICARE

## 2025-03-19 DIAGNOSIS — N95.2 VAGINAL ATROPHY: ICD-10-CM

## 2025-03-19 DIAGNOSIS — N39.0 RECURRENT UTI: Primary | ICD-10-CM

## 2025-03-19 LAB
BILIRUBIN, URINE, POC: NEGATIVE
BLOOD URINE, POC: NEGATIVE
GLUCOSE URINE, POC: NEGATIVE MG/DL
KETONES, URINE, POC: NEGATIVE MG/DL
LEUKOCYTE ESTERASE, URINE, POC: NORMAL
NITRITE, URINE, POC: NEGATIVE
PH, URINE, POC: 5.5 (ref 4.6–8)
PROTEIN,URINE, POC: NEGATIVE MG/DL
SPECIFIC GRAVITY, URINE, POC: 1.02 (ref 1–1.03)
URINALYSIS CLARITY, POC: NORMAL
URINALYSIS COLOR, POC: NORMAL
UROBILINOGEN, POC: NORMAL MG/DL

## 2025-03-19 PROCEDURE — 1090F PRES/ABSN URINE INCON ASSESS: CPT | Performed by: NURSE PRACTITIONER

## 2025-03-19 PROCEDURE — G8427 DOCREV CUR MEDS BY ELIG CLIN: HCPCS | Performed by: NURSE PRACTITIONER

## 2025-03-19 PROCEDURE — 1123F ACP DISCUSS/DSCN MKR DOCD: CPT | Performed by: NURSE PRACTITIONER

## 2025-03-19 PROCEDURE — 81003 URINALYSIS AUTO W/O SCOPE: CPT | Performed by: NURSE PRACTITIONER

## 2025-03-19 PROCEDURE — 1159F MED LIST DOCD IN RCRD: CPT | Performed by: NURSE PRACTITIONER

## 2025-03-19 PROCEDURE — G8420 CALC BMI NORM PARAMETERS: HCPCS | Performed by: NURSE PRACTITIONER

## 2025-03-19 PROCEDURE — 99214 OFFICE O/P EST MOD 30 MIN: CPT | Performed by: NURSE PRACTITIONER

## 2025-03-19 PROCEDURE — G8399 PT W/DXA RESULTS DOCUMENT: HCPCS | Performed by: NURSE PRACTITIONER

## 2025-03-19 PROCEDURE — 1036F TOBACCO NON-USER: CPT | Performed by: NURSE PRACTITIONER

## 2025-03-19 PROCEDURE — 1160F RVW MEDS BY RX/DR IN RCRD: CPT | Performed by: NURSE PRACTITIONER

## 2025-03-19 ASSESSMENT — ENCOUNTER SYMPTOMS: BACK PAIN: 0

## 2025-03-19 NOTE — PROGRESS NOTES
daily. I also recommend avoiding consumption of sugary beverages and other known bladder irritants.    ROV in 6 months. To call sooner if needed.     Stefany Frederick, APRN - CNP  Dr. Alvarez is supervising physician today and he approves plan of care.

## 2025-07-03 ENCOUNTER — RX ONLY (RX ONLY)
Age: 80
End: 2025-07-03

## 2025-07-03 ENCOUNTER — APPOINTMENT (OUTPATIENT)
Dept: URBAN - METROPOLITAN AREA CLINIC 329 | Facility: CLINIC | Age: 80
Setting detail: DERMATOLOGY
End: 2025-07-03

## 2025-07-03 DIAGNOSIS — L30.9 DERMATITIS, UNSPECIFIED: ICD-10-CM

## 2025-07-03 PROCEDURE — ? PRESCRIPTION

## 2025-07-03 PROCEDURE — ? COUNSELING

## 2025-07-03 PROCEDURE — ? TREATMENT REGIMEN

## 2025-07-03 PROCEDURE — ? PRESCRIPTION MEDICATION MANAGEMENT

## 2025-07-03 RX ORDER — TRIAMCINOLONE ACETONIDE 1 MG/G
CREAM TOPICAL
Qty: 454 | Refills: 3 | Status: ERX | COMMUNITY
Start: 2025-07-03

## 2025-07-03 RX ORDER — TRIAMCINOLONE ACETONIDE 1 MG/G
CREAM TOPICAL
Qty: 45 | Refills: 3 | Status: ERX

## 2025-07-03 RX ADMIN — TRIAMCINOLONE ACETONIDE: 1 CREAM TOPICAL at 00:00

## 2025-07-03 ASSESSMENT — LOCATION SIMPLE DESCRIPTION DERM: LOCATION SIMPLE: ABDOMEN

## 2025-07-03 ASSESSMENT — LOCATION DETAILED DESCRIPTION DERM: LOCATION DETAILED: PERIUMBILICAL SKIN

## 2025-07-03 ASSESSMENT — LOCATION ZONE DERM: LOCATION ZONE: TRUNK

## 2025-07-03 NOTE — PROCEDURE: PRESCRIPTION MEDICATION MANAGEMENT
Detail Level: Zone
Render In Strict Bullet Format?: No
Initiate Treatment: Triamcinolone
Plan: Follow with moisturizer

## (undated) DEVICE — KENDALL RADIOLUCENT FOAM MONITORING ELECTRODE RECTANGULAR SHAPE: Brand: KENDALL

## (undated) DEVICE — CANNULA NSL ORAL AD FOR CAPNOFLEX CO2 O2 AIRLFE

## (undated) DEVICE — SYR 3ML LL TIP 1/10ML GRAD --

## (undated) DEVICE — CONNECTOR TBNG OD5-7MM O2 END DISP

## (undated) DEVICE — SYR 5ML 1/5 GRAD LL NSAF LF --

## (undated) DEVICE — NDL PRT INJ NSAF BLNT 18GX1.5 --